# Patient Record
Sex: FEMALE | Race: WHITE | NOT HISPANIC OR LATINO | Employment: FULL TIME | RURAL
[De-identification: names, ages, dates, MRNs, and addresses within clinical notes are randomized per-mention and may not be internally consistent; named-entity substitution may affect disease eponyms.]

---

## 2018-07-05 ENCOUNTER — OFFICE VISIT (OUTPATIENT)
Dept: RETAIL CLINIC | Facility: CLINIC | Age: 48
End: 2018-07-05

## 2018-07-05 VITALS
SYSTOLIC BLOOD PRESSURE: 122 MMHG | OXYGEN SATURATION: 99 % | HEART RATE: 85 BPM | DIASTOLIC BLOOD PRESSURE: 80 MMHG | WEIGHT: 233 LBS | RESPIRATION RATE: 18 BRPM | HEIGHT: 63 IN | TEMPERATURE: 97.6 F | BODY MASS INDEX: 41.29 KG/M2

## 2018-07-05 DIAGNOSIS — R31.9 URINARY TRACT INFECTION WITH HEMATURIA, SITE UNSPECIFIED: Primary | ICD-10-CM

## 2018-07-05 DIAGNOSIS — N39.0 URINARY TRACT INFECTION WITH HEMATURIA, SITE UNSPECIFIED: Primary | ICD-10-CM

## 2018-07-05 DIAGNOSIS — K21.9 GASTROESOPHAGEAL REFLUX DISEASE, ESOPHAGITIS PRESENCE NOT SPECIFIED: ICD-10-CM

## 2018-07-05 LAB
BILIRUB BLD-MCNC: NEGATIVE MG/DL
CLARITY, POC: ABNORMAL
COLOR UR: YELLOW
GLUCOSE UR STRIP-MCNC: NEGATIVE MG/DL
KETONES UR QL: NEGATIVE
LEUKOCYTE EST, POC: ABNORMAL
NITRITE UR-MCNC: NEGATIVE MG/ML
PH UR: 6 [PH] (ref 5–8)
PROT UR STRIP-MCNC: ABNORMAL MG/DL
RBC # UR STRIP: ABNORMAL /UL
SP GR UR: 1.02 (ref 1–1.03)
UROBILINOGEN UR QL: NORMAL

## 2018-07-05 PROCEDURE — 81003 URINALYSIS AUTO W/O SCOPE: CPT | Performed by: NURSE PRACTITIONER

## 2018-07-05 PROCEDURE — 99203 OFFICE O/P NEW LOW 30 MIN: CPT | Performed by: NURSE PRACTITIONER

## 2018-07-05 RX ORDER — RANITIDINE 150 MG/1
150 CAPSULE ORAL 2 TIMES DAILY
Qty: 60 CAPSULE | Refills: 0 | Status: SHIPPED | OUTPATIENT
Start: 2018-07-05 | End: 2018-08-04

## 2018-07-05 RX ORDER — PHENAZOPYRIDINE HYDROCHLORIDE 200 MG/1
200 TABLET, FILM COATED ORAL 3 TIMES DAILY PRN
Qty: 9 TABLET | Refills: 0 | Status: SHIPPED | OUTPATIENT
Start: 2018-07-05 | End: 2018-07-07

## 2018-07-05 RX ORDER — SULFAMETHOXAZOLE AND TRIMETHOPRIM 800; 160 MG/1; MG/1
1 TABLET ORAL 2 TIMES DAILY
Qty: 10 TABLET | Refills: 0 | Status: SHIPPED | OUTPATIENT
Start: 2018-07-05 | End: 2018-07-10

## 2018-07-05 NOTE — PROGRESS NOTES
Subjective   Radha Kimbrough is a 48 y.o. female.   Chief Complaint   Patient presents with   • Urinary Tract Infection   • Heartburn      Urinary Tract Infection    This is a new problem. Episode onset: 3-4 days. The problem occurs every urination. The problem has been gradually worsening. The quality of the pain is described as burning. The pain is at a severity of 3/10. The pain is mild. There has been no fever. She is sexually active. History of pyelonephritis: has had uti in remote past. Associated symptoms include frequency, hesitancy and urgency. Pertinent negatives include no chills, discharge, flank pain, hematuria or vomiting. She has tried nothing for the symptoms. There is no history of kidney stones.   Heartburn   She complains of abdominal pain (suprapubic tenderness, bloating), belching and heartburn. She reports no sore throat. This is a new problem. The current episode started 1 to 4 weeks ago. The problem occurs frequently. The problem has been waxing and waning. The heartburn duration is less than a minute. The heartburn is located in the substernum. The heartburn is of mild intensity. The heartburn does not wake her from sleep. The heartburn does not limit her activity. The heartburn doesn't change with position. The symptoms are aggravated by stress and certain foods. Pertinent negatives include no anemia or fatigue. Risk factors include obesity. She has tried nothing for the symptoms.     Radha presents to [facility] with cc of dysuria, urgency and frequency for 3-4 days, she denies fever or chilling and says she has taken nothing for her symptoms.  Radha kristen says she has had belching, bloating, felt gaseous for over about a month and says she has taken nothing for those symptoms.  Reviewed PMFSH, has annual Flu Vaccine.  See ROS.       The following portions of the patient's history were reviewed and updated as appropriate: allergies, current medications, past family history, past medical  "history, past social history, past surgical history and problem list.    Review of Systems   Constitutional: Negative for chills, fatigue and fever.   HENT: Negative for sore throat.    Gastrointestinal: Positive for abdominal pain (suprapubic tenderness, bloating) and heartburn. Negative for abdominal distention and vomiting.   Endocrine: Negative for cold intolerance.   Genitourinary: Positive for dysuria, frequency, hesitancy and urgency. Negative for flank pain, hematuria and vaginal discharge.   Skin: Negative for pallor.   Allergic/Immunologic: Negative.  Negative for environmental allergies, food allergies and immunocompromised state.   Neurological: Negative for headaches.   Hematological: Negative for adenopathy.     /80 (BP Location: Right arm, Patient Position: Sitting, Cuff Size: Adult)   Pulse 85   Temp 97.6 °F (36.4 °C) (Temporal Artery )   Resp 18   Ht 160 cm (63\")   Wt 106 kg (233 lb)   LMP 06/05/2018 (Approximate)   SpO2 99%   BMI 41.27 kg/m²     Objective     Current Outpatient Prescriptions:   •  phenazopyridine (PYRIDIUM) 200 MG tablet, Take 1 tablet by mouth 3 (Three) Times a Day As Needed for bladder spasms for up to 2 days., Disp: 9 tablet, Rfl: 0  •  ranitidine (ZANTAC) 150 MG capsule, Take 1 capsule by mouth 2 (Two) Times a Day for 30 days., Disp: 60 capsule, Rfl: 0  •  sulfamethoxazole-trimethoprim (BACTRIM DS) 800-160 MG per tablet, Take 1 tablet by mouth 2 (Two) Times a Day for 5 days., Disp: 10 tablet, Rfl: 0  Allergies   Allergen Reactions   • Penicillins Shortness Of Breath     Had to go to the ER and have Benadryl injection       Physical Exam   Constitutional: She is oriented to person, place, and time. She appears well-developed and well-nourished. No distress.   HENT:   Head: Normocephalic and atraumatic.   Right Ear: Tympanic membrane and external ear normal.   Left Ear: Tympanic membrane and external ear normal.   Nose: Nose normal.   Mouth/Throat: Uvula is midline, " oropharynx is clear and moist and mucous membranes are normal.   Eyes: Conjunctivae and EOM are normal. Pupils are equal, round, and reactive to light.   Neck: Normal range of motion. Neck supple.   Cardiovascular: Normal rate, regular rhythm and normal heart sounds.    Pulmonary/Chest: Effort normal and breath sounds normal.   Abdominal: Soft. Bowel sounds are normal. She exhibits no distension and no mass. There is tenderness (suprapubic , mild epigastric tenderness) in the suprapubic area. There is no rebound and no guarding.   Musculoskeletal: Normal range of motion.   Neurological: She is alert and oriented to person, place, and time.   Skin: Skin is warm and dry. No rash noted.   Psychiatric: She has a normal mood and affect. Her behavior is normal. Judgment and thought content normal.   Nursing note and vitals reviewed.      Assessment/Plan   Radha was seen today for urinary tract infection and heartburn.    Diagnoses and all orders for this visit:    Urinary tract infection with hematuria, site unspecified  -     sulfamethoxazole-trimethoprim (BACTRIM DS) 800-160 MG per tablet; Take 1 tablet by mouth 2 (Two) Times a Day for 5 days.  -     phenazopyridine (PYRIDIUM) 200 MG tablet; Take 1 tablet by mouth 3 (Three) Times a Day As Needed for bladder spasms for up to 2 days.  -     POC Urinalysis Dipstick, Automated    Gastroesophageal reflux disease, esophagitis presence not specified  -     ranitidine (ZANTAC) 150 MG capsule; Take 1 capsule by mouth 2 (Two) Times a Day for 30 days.      Results for orders placed or performed in visit on 07/05/18   POC Urinalysis Dipstick, Automated   Result Value Ref Range    Color Yellow Yellow, Straw, Dark Yellow, Melisa    Clarity, UA Cloudy (A) Clear    Specific Gravity  1.020 1.005 - 1.030    pH, Urine 6.0 5.0 - 8.0    Leukocytes Moderate (2+) (A) Negative    Nitrite, UA Negative Negative    Protein, POC 30 mg/dL (A) Negative mg/dL    Glucose, UA Negative Negative, 1000  mg/dL (3+) mg/dL    Ketones, UA Negative Negative    Urobilinogen, UA Normal Normal    Bilirubin Negative Negative    Blood, UA 2+ (A) Negative

## 2018-07-05 NOTE — PATIENT INSTRUCTIONS
Gastroesophageal Reflux Disease, Adult  Normally, food travels down the esophagus and stays in the stomach to be digested. However, when a person has gastroesophageal reflux disease (GERD), food and stomach acid move back up into the esophagus. When this happens, the esophagus becomes sore and inflamed. Over time, GERD can create small holes (ulcers) in the lining of the esophagus.  What are the causes?  This condition is caused by a problem with the muscle between the esophagus and the stomach (lower esophageal sphincter, or LES). Normally, the LES muscle closes after food passes through the esophagus to the stomach. When the LES is weakened or abnormal, it does not close properly, and that allows food and stomach acid to go back up into the esophagus. The LES can be weakened by certain dietary substances, medicines, and medical conditions, including:  · Tobacco use.  · Pregnancy.  · Having a hiatal hernia.  · Heavy alcohol use.  · Certain foods and beverages, such as coffee, chocolate, onions, and peppermint.    What increases the risk?  This condition is more likely to develop in:  · People who have an increased body weight.  · People who have connective tissue disorders.  · People who use NSAID medicines.    What are the signs or symptoms?  Symptoms of this condition include:  · Heartburn.  · Difficult or painful swallowing.  · The feeling of having a lump in the throat.  · A bitter taste in the mouth.  · Bad breath.  · Having a large amount of saliva.  · Having an upset or bloated stomach.  · Belching.  · Chest pain.  · Shortness of breath or wheezing.  · Ongoing (chronic) cough or a night-time cough.  · Wearing away of tooth enamel.  · Weight loss.    Different conditions can cause chest pain. Make sure to see your health care provider if you experience chest pain.  How is this diagnosed?  Your health care provider will take a medical history and perform a physical exam. To determine if you have mild or severe  GERD, your health care provider may also monitor how you respond to treatment. You may also have other tests, including:  · An endoscopy to examine your stomach and esophagus with a small camera.  · A test that measures the acidity level in your esophagus.  · A test that measures how much pressure is on your esophagus.  · A barium swallow or modified barium swallow to show the shape, size, and functioning of your esophagus.    How is this treated?  The goal of treatment is to help relieve your symptoms and to prevent complications. Treatment for this condition may vary depending on how severe your symptoms are. Your health care provider may recommend:  · Changes to your diet.  · Medicine.  · Surgery.    Follow these instructions at home:  Diet  · Follow a diet as recommended by your health care provider. This may involve avoiding foods and drinks such as:  ? Coffee and tea (with or without caffeine).  ? Drinks that contain alcohol.  ? Energy drinks and sports drinks.  ? Carbonated drinks or sodas.  ? Chocolate and cocoa.  ? Peppermint and mint flavorings.  ? Garlic and onions.  ? Horseradish.  ? Spicy and acidic foods, including peppers, chili powder, paz powder, vinegar, hot sauces, and barbecue sauce.  ? Citrus fruit juices and citrus fruits, such as oranges, luis, and limes.  ? Tomato-based foods, such as red sauce, chili, salsa, and pizza with red sauce.  ? Fried and fatty foods, such as donuts, french fries, potato chips, and high-fat dressings.  ? High-fat meats, such as hot dogs and fatty cuts of red and white meats, such as rib eye steak, sausage, ham, and gilmore.  ? High-fat dairy items, such as whole milk, butter, and cream cheese.  · Eat small, frequent meals instead of large meals.  · Avoid drinking large amounts of liquid with your meals.  · Avoid eating meals during the 2-3 hours before bedtime.  · Avoid lying down right after you eat.  · Do not exercise right after you eat.  General  instructions  · Pay attention to any changes in your symptoms.  · Take over-the-counter and prescription medicines only as told by your health care provider. Do not take aspirin, ibuprofen, or other NSAIDs unless your health care provider told you to do so.  · Do not use any tobacco products, including cigarettes, chewing tobacco, and e-cigarettes. If you need help quitting, ask your health care provider.  · Wear loose-fitting clothing. Do not wear anything tight around your waist that causes pressure on your abdomen.  · Raise (elevate) the head of your bed 6 inches (15cm).  · Try to reduce your stress, such as with yoga or meditation. If you need help reducing stress, ask your health care provider.  · If you are overweight, reduce your weight to an amount that is healthy for you. Ask your health care provider for guidance about a safe weight loss goal.  · Keep all follow-up visits as told by your health care provider. This is important.  Contact a health care provider if:  · You have new symptoms.  · You have unexplained weight loss.  · You have difficulty swallowing, or it hurts to swallow.  · You have wheezing or a persistent cough.  · Your symptoms do not improve with treatment.  · You have a hoarse voice.  Get help right away if:  · You have pain in your arms, neck, jaw, teeth, or back.  · You feel sweaty, dizzy, or light-headed.  · You have chest pain or shortness of breath.  · You vomit and your vomit looks like blood or coffee grounds.  · You faint.  · Your stool is bloody or black.  · You cannot swallow, drink, or eat.  This information is not intended to replace advice given to you by your health care provider. Make sure you discuss any questions you have with your health care provider.  Document Released: 09/27/2006 Document Revised: 05/17/2017 Document Reviewed: 04/13/2016  [employer] Interactive Patient Education © 2018 [employer]  Urinary Tract Infection, Adult  A urinary tract infection (UTI) is an  infection of any part of the urinary tract, which includes the kidneys, ureters, bladder, and urethra. These organs make, store, and get rid of urine in the body. UTI can be a bladder infection (cystitis) or kidney infection (pyelonephritis).  What are the causes?  This infection may be caused by fungi, viruses, or bacteria. Bacteria are the most common cause of UTIs. This condition can also be caused by repeated incomplete emptying of the bladder during urination.  What increases the risk?  This condition is more likely to develop if:  · You ignore your need to urinate or hold urine for long periods of time.  · You do not empty your bladder completely during urination.  · You wipe back to front after urinating or having a bowel movement, if you are female.  · You are uncircumcised, if you are male.  · You are constipated.  · You have a urinary catheter that stays in place (indwelling).  · You have a weak defense (immune) system.  · You have a medical condition that affects your bowels, kidneys, or bladder.  · You have diabetes.  · You take antibiotic medicines frequently or for long periods of time, and the antibiotics no longer work well against certain types of infections (antibiotic resistance).  · You take medicines that irritate your urinary tract.  · You are exposed to chemicals that irritate your urinary tract.  · You are female.    What are the signs or symptoms?  Symptoms of this condition include:  · Fever.  · Frequent urination or passing small amounts of urine frequently.  · Needing to urinate urgently.  · Pain or burning with urination.  · Urine that smells bad or unusual.  · Cloudy urine.  · Pain in the lower abdomen or back.  · Trouble urinating.  · Blood in the urine.  · Vomiting or being less hungry than normal.  · Diarrhea or abdominal pain.  · Vaginal discharge, if you are female.    How is this diagnosed?  This condition is diagnosed with a medical history and physical exam. You will also need  to provide a urine sample to test your urine. Other tests may be done, including:  · Blood tests.  · Sexually transmitted disease (STD) testing.    If you have had more than one UTI, a cystoscopy or imaging studies may be done to determine the cause of the infections.  How is this treated?  Treatment for this condition often includes a combination of two or more of the following:  · Antibiotic medicine.  · Other medicines to treat less common causes of UTI.  · Over-the-counter medicines to treat pain.  · Drinking enough water to stay hydrated.    Follow these instructions at home:  · Take over-the-counter and prescription medicines only as told by your health care provider.  · If you were prescribed an antibiotic, take it as told by your health care provider. Do not stop taking the antibiotic even if you start to feel better.  · Avoid alcohol, caffeine, tea, and carbonated beverages. They can irritate your bladder.  · Drink enough fluid to keep your urine clear or pale yellow.  · Keep all follow-up visits as told by your health care provider. This is important.  · Make sure to:  ? Empty your bladder often and completely. Do not hold urine for long periods of time.  ? Empty your bladder before and after sex.  ? Wipe from front to back after a bowel movement if you are female. Use each tissue one time when you wipe.  Contact a health care provider if:  · You have back pain.  · You have a fever.  · You feel nauseous or vomit.  · Your symptoms do not get better after 3 days.  · Your symptoms go away and then return.  Get help right away if:  · You have severe back pain or lower abdominal pain.  · You are vomiting and cannot keep down any medicines or water.  This information is not intended to replace advice given to you by your health care provider. Make sure you discuss any questions you have with your health care provider.  Document Released: 09/27/2006 Document Revised: 05/31/2017 Document Reviewed:  11/07/2016  [employer] Interactive Patient Education © 2018 [employer]

## 2018-08-30 ENCOUNTER — OFFICE VISIT (OUTPATIENT)
Dept: RETAIL CLINIC | Facility: CLINIC | Age: 48
End: 2018-08-30

## 2018-08-30 VITALS
SYSTOLIC BLOOD PRESSURE: 128 MMHG | WEIGHT: 233 LBS | HEART RATE: 78 BPM | BODY MASS INDEX: 41.27 KG/M2 | DIASTOLIC BLOOD PRESSURE: 80 MMHG | TEMPERATURE: 98.3 F | RESPIRATION RATE: 20 BRPM | OXYGEN SATURATION: 98 %

## 2018-08-30 DIAGNOSIS — H66.91 OTITIS OF RIGHT EAR: Primary | ICD-10-CM

## 2018-08-30 DIAGNOSIS — H61.21 HEARING LOSS DUE TO CERUMEN IMPACTION, RIGHT: ICD-10-CM

## 2018-08-30 PROCEDURE — 99213 OFFICE O/P EST LOW 20 MIN: CPT | Performed by: NURSE PRACTITIONER

## 2018-08-30 PROCEDURE — 69209 REMOVE IMPACTED EAR WAX UNI: CPT | Performed by: NURSE PRACTITIONER

## 2018-08-30 RX ORDER — FLUTICASONE PROPIONATE 50 MCG
2 SPRAY, SUSPENSION (ML) NASAL DAILY
Qty: 1 BOTTLE | Refills: 0 | Status: SHIPPED | OUTPATIENT
Start: 2018-08-30 | End: 2018-09-29

## 2018-08-30 RX ORDER — CEFDINIR 300 MG/1
300 CAPSULE ORAL 2 TIMES DAILY
Qty: 20 CAPSULE | Refills: 0 | Status: SHIPPED | OUTPATIENT
Start: 2018-08-30 | End: 2018-09-09

## 2018-08-30 NOTE — PROGRESS NOTES
Subjective   Radha Kimbrough is a 48 y.o. female.     Chief Complaint   Patient presents with   • Earache   • Hearing Loss        Earache    There is pain in the right ear. This is a new problem. Episode onset: 5 days. The problem occurs constantly. The problem has been gradually worsening. There has been no fever. The pain is at a severity of 7/10. The pain is severe. Associated symptoms include ear discharge, headaches and hearing loss. Associated symptoms comments: Right side facial pressure, sinus drainage, decreased hearing right side. She has tried acetaminophen (sudafed, benadryl) for the symptoms. The treatment provided no relief.        The following portions of the patient's history were reviewed and updated as appropriate: allergies, current medications, past family history, past medical history, past social history, past surgical history and problem list.    Review of Systems   Constitutional: Negative.  Negative for fever.   HENT: Positive for ear discharge, ear pain, hearing loss, sinus pain and sinus pressure.    Eyes: Negative.    Respiratory: Negative.    Gastrointestinal: Negative.    Genitourinary: Negative.    Skin: Negative.    Allergic/Immunologic: Negative.    Neurological: Positive for headaches.   Psychiatric/Behavioral: Negative.    All other systems reviewed and are negative.      Objective   Allergies   Allergen Reactions   • Penicillins Shortness Of Breath     Had to go to the ER and have Benadryl injection       Physical Exam   Constitutional: She is oriented to person, place, and time. She appears well-developed and well-nourished.   HENT:   Right Ear: Tympanic membrane is injected and erythematous.   Left Ear: Tympanic membrane normal.   Nose: Mucosal edema present. Right sinus exhibits maxillary sinus tenderness and frontal sinus tenderness.   Mouth/Throat: Oropharynx is clear and moist.   Right side cerumen impaction   Eyes: Pupils are equal, round, and reactive to light.   Neck:  Neck supple.   Cardiovascular: Normal rate and regular rhythm.    Pulmonary/Chest: Effort normal and breath sounds normal.   Lymphadenopathy:     She has no cervical adenopathy.   Neurological: She is alert and oriented to person, place, and time.   Skin: Skin is warm and dry.   Psychiatric: She has a normal mood and affect.   Vitals reviewed.      Ear Cerumen Removal Instrumentation  Date/Time: 8/30/2018 11:15 AM  Performed by: JAN AYALA  Authorized by: JAN AYALA   Consent: Verbal consent obtained.  Risks and benefits: risks, benefits and alternatives were discussed  Consent given by: patient  Patient understanding: patient states understanding of the procedure being performed  Patient consent: the patient's understanding of the procedure matches consent given  Patient identity confirmed: verbally with patient    Anesthesia:  Local Anesthetic: none  Location details: right ear  Comments: Large cerumen plug expelled, TM intact but erythemic and injected. Tolerated well  Procedure type: irrigation (With curette)      Sedation:  Patient sedated: no             Assessment/Plan   Radha was seen today for earache and hearing loss.    Diagnoses and all orders for this visit:    Otitis of right ear    Hearing loss due to cerumen impaction, right    Other orders  -     cefdinir (OMNICEF) 300 MG capsule; Take 1 capsule by mouth 2 (Two) Times a Day for 10 days.  -     fluticasone (FLONASE) 50 MCG/ACT nasal spray; 2 sprays into the nostril(s) as directed by provider Daily for 30 days. Administer 2 sprays in each nostril for each dose.                 This document has been electronically signed by ERNESTO Alberto August 30, 2018 11:12 AM

## 2018-08-30 NOTE — PATIENT INSTRUCTIONS
Ear Drops, Adult  Your doctor has found that you have a condition that requires you to use ear drops. Ear drops are a medicine that is placed in the ear. This sheet gives you information about how to use this medicine. Your doctor may also give you more specific instructions.  Supplies needed:  · Cotton ball.  · Medicine.  How to put ear drops into your ear  1. Wash your hands with soap and water.  2. Make sure your ears are clean and dry.  3. Warm the medicine by holding it in your hand for a few minutes.  4. Shake the medicine to mix the ear drops.  5. Use the tube to get the medicine. You will need to squeeze the round part of the tube to do this.  6. Put the drops in your ear as told. Hold the tube above your ear. Do not let the tube touch your ear. The medicine may go in easier if you pull the flap of your ear up and back while you put the drops in.  7. To make sure your ear soaks up the medicine, do one of these things:  ? Lie down for 10 minutes. The ear with the medicine should face up.  ? Put a cotton ball in your ear. Do not push it deeper into your ear. Take out the cotton ball when the drops have been soaked up.  8. If you need to put drops in your other ear, repeat the same steps. Your doctor will tell you if you should put drops in both ears.  Follow these instructions at home:  · Use the ear drops for as long as your doctor tells you to. Do not stop even if your symptoms get better.  · Keep the ear drops at room temperature.  · Keep all follow-up visits as told by your doctor. This is important.  Contact a doctor if:  · Your condition gets worse.  · Your pain gets worse.  · Unusual fluid (drainage) is coming from your ear, especially if the fluid stinks.  · You have trouble hearing.  Get help right away if:  · You feel like the room is spinning and you feel sick to your stomach. This condition is called vertigo.  · The outside of your ear becomes red or swollen.  · You have a very bad  headache.  Summary  · Ear drops are a medicine that is put in the ear.  · Put the drops in your ear as told by your doctor.  · Use the ear drops for as long as your doctor tells you to. Do not stop even if your symptoms get better.  This information is not intended to replace advice given to you by your health care provider. Make sure you discuss any questions you have with your health care provider.  Document Released: 06/07/2011 Document Revised: 12/22/2017 Document Reviewed: 12/22/2017  [employer] Patient Education © 2017 [employer]    Earwax Buildup, Adult  The ears produce a substance called earwax that helps keep bacteria out of the ear and protects the skin in the ear canal. Occasionally, earwax can build up in the ear and cause discomfort or hearing loss.  What increases the risk?  This condition is more likely to develop in people who:  · Are male.  · Are elderly.  · Naturally produce more earwax.  · Clean their ears often with cotton swabs.  · Use earplugs often.  · Use in-ear headphones often.  · Wear hearing aids.  · Have narrow ear canals.  · Have earwax that is overly thick or sticky.  · Have eczema.  · Are dehydrated.  · Have excess hair in the ear canal.    What are the signs or symptoms?  Symptoms of this condition include:  · Reduced or muffled hearing.  · A feeling of fullness in the ear or feeling that the ear is plugged.  · Fluid coming from the ear.  · Ear pain.  · Ear itch.  · Ringing in the ear.  · Coughing.  · An obvious piece of earwax that can be seen inside the ear canal.    How is this diagnosed?  This condition may be diagnosed based on:  · Your symptoms.  · Your medical history.  · An ear exam. During the exam, your health care provider will look into your ear with an instrument called an otoscope.    You may have tests, including a hearing test.  How is this treated?  This condition may be treated by:  · Using ear drops to soften the earwax.  · Having the earwax removed  by a health care provider. The health care provider may:  ? Flush the ear with water.  ? Use an instrument that has a loop on the end (curette).  ? Use a suction device.  · Surgery to remove the wax buildup. This may be done in severe cases.    Follow these instructions at home:  · Take over-the-counter and prescription medicines only as told by your health care provider.  · Do not put any objects, including cotton swabs, into your ear. You can clean the opening of your ear canal with a washcloth or facial tissue.  · Follow instructions from your health care provider about cleaning your ears. Do not over-clean your ears.  · Drink enough fluid to keep your urine clear or pale yellow. This will help to thin the earwax.  · Keep all follow-up visits as told by your health care provider. If earwax builds up in your ears often or if you use hearing aids, consider seeing your health care provider for routine, preventive ear cleanings. Ask your health care provider how often you should schedule your cleanings.  · If you have hearing aids, clean them according to instructions from the  and your health care provider.  Contact a health care provider if:  · You have ear pain.  · You develop a fever.  · You have blood, pus, or other fluid coming from your ear.  · You have hearing loss.  · You have ringing in your ears that does not go away.  · Your symptoms do not improve with treatment.  · You feel like the room is spinning (vertigo).  Summary  · Earwax can build up in the ear and cause discomfort or hearing loss.  · The most common symptoms of this condition include reduced or muffled hearing and a feeling of fullness in the ear or feeling that the ear is plugged.  · This condition may be diagnosed based on your symptoms, your medical history, and an ear exam.  · This condition may be treated by using ear drops to soften the earwax or by having the earwax removed by a health care provider.  · Do not put any  objects, including cotton swabs, into your ear. You can clean the opening of your ear canal with a washcloth or facial tissue.  This information is not intended to replace advice given to you by your health care provider. Make sure you discuss any questions you have with your health care provider.  Document Released: 01/25/2006 Document Revised: 02/28/2018 Document Reviewed: 02/28/2018  [employer] Patient Education © 2018 [employer]    Otitis Media, Adult  Otitis media is redness, soreness, and puffiness (swelling) in the space just behind your eardrum (middle ear). It may be caused by allergies or infection. It often happens along with a cold.  Follow these instructions at home:  · Take your medicine as told. Finish it even if you start to feel better.  · Only take over-the-counter or prescription medicines for pain, discomfort, or fever as told by your doctor.  · Follow up with your doctor as told.  Contact a doctor if:  · You have otitis media only in one ear, or bleeding from your nose, or both.  · You notice a lump on your neck.  · You are not getting better in 3-5 days.  · You feel worse instead of better.  Get help right away if:  · You have pain that is not helped with medicine.  · You have puffiness, redness, or pain around your ear.  · You get a stiff neck.  · You cannot move part of your face (paralysis).  · You notice that the bone behind your ear hurts when you touch it.  This information is not intended to replace advice given to you by your health care provider. Make sure you discuss any questions you have with your health care provider.  Document Released: 06/05/2009 Document Revised: 05/25/2017 Document Reviewed: 07/15/2014  [employer] Patient Education © 2017 [employer]

## 2019-03-20 ENCOUNTER — OFFICE VISIT (OUTPATIENT)
Dept: RETAIL CLINIC | Facility: CLINIC | Age: 49
End: 2019-03-20

## 2019-03-20 VITALS
WEIGHT: 230 LBS | SYSTOLIC BLOOD PRESSURE: 124 MMHG | HEART RATE: 86 BPM | BODY MASS INDEX: 40.74 KG/M2 | RESPIRATION RATE: 20 BRPM | OXYGEN SATURATION: 96 % | TEMPERATURE: 98.8 F | DIASTOLIC BLOOD PRESSURE: 82 MMHG

## 2019-03-20 DIAGNOSIS — H91.92 HEARING LOSS OF LEFT EAR, UNSPECIFIED HEARING LOSS TYPE: Primary | ICD-10-CM

## 2019-03-20 DIAGNOSIS — H61.22 IMPACTED CERUMEN OF LEFT EAR: ICD-10-CM

## 2019-03-20 PROCEDURE — 99213 OFFICE O/P EST LOW 20 MIN: CPT | Performed by: NURSE PRACTITIONER

## 2019-03-20 PROCEDURE — 69209 REMOVE IMPACTED EAR WAX UNI: CPT | Performed by: NURSE PRACTITIONER

## 2019-03-20 RX ORDER — CYANOCOBALAMIN 1000 UG/ML
INJECTION, SOLUTION INTRAMUSCULAR; SUBCUTANEOUS
COMMUNITY
Start: 2019-03-18 | End: 2022-11-04

## 2019-03-20 RX ORDER — IBUPROFEN AND FAMOTIDINE 800; 26.6 MG/1; MG/1
TABLET, COATED ORAL
COMMUNITY
Start: 2019-01-31 | End: 2022-11-04

## 2019-03-20 RX ORDER — ERGOCALCIFEROL 1.25 MG/1
CAPSULE ORAL
COMMUNITY
Start: 2019-02-27 | End: 2022-11-04

## 2019-03-20 NOTE — PATIENT INSTRUCTIONS
Hearing Loss  Hearing loss is a partial or total loss of the ability to hear. This can be temporary or permanent, and it can happen in one or both ears. Hearing loss may be referred to as deafness.  Medical care is necessary to treat hearing loss properly and to prevent the condition from getting worse. Your hearing may partially or completely come back, depending on what caused your hearing loss and how severe it is. In some cases, hearing loss is permanent.  What are the causes?  Common causes of hearing loss include:  · Too much wax in the ear canal.  · Infection of the ear canal or middle ear.  · Fluid in the middle ear.  · Injury to the ear or surrounding area.  · An object stuck in the ear.  · Prolonged exposure to loud sounds, such as music.    Less common causes of hearing loss include:  · Tumors in the ear.  · Viral or bacterial infections, such as meningitis.  · A hole in the eardrum (perforated eardrum).  · Problems with the hearing nerve that sends signals between the brain and the ear.  · Certain medicines.    What are the signs or symptoms?  Symptoms of this condition may include:  · Difficulty telling the difference between sounds.  · Difficulty following a conversation when there is background noise.  · Lack of response to sounds in your environment. This may be most noticeable when you do not respond to startling sounds.  · Needing to turn up the volume on the television, radio, etc.  · Ringing in the ears.  · Dizziness.  · Pain in the ears.    How is this diagnosed?  This condition is diagnosed based on a physical exam and a hearing test (audiometry). The audiometry test will be performed by a hearing specialist (audiologist). You may also be referred to an ear, nose, and throat (ENT) specialist (otolaryngologist).  How is this treated?  Treatment for recent onset of hearing loss may include:  · Ear wax removal.  · Being prescribed medicines to prevent infection (antibiotics).  · Being prescribed  medicines to reduce inflammation (corticosteroids).    Follow these instructions at home:  · If you were prescribed an antibiotic medicine, take it as told by your health care provider. Do not stop taking the antibiotic even if you start to feel better.  · Take over-the-counter and prescription medicines only as told by your health care provider.  · Avoid loud noises.  · Return to your normal activities as told by your health care provider. Ask your health care provider what activities are safe for you.  · Keep all follow-up visits as told by your health care provider. This is important.  Contact a health care provider if:  · You feel dizzy.  · You develop new symptoms.  · You vomit or feel nauseous.  · You have a fever.  Get help right away if:  · You develop sudden changes in your vision.  · You have severe ear pain.  · You have new or increased weakness.  · You have a severe headache.  This information is not intended to replace advice given to you by your health care provider. Make sure you discuss any questions you have with your health care provider.  Document Released: 12/18/2006 Document Revised: 05/25/2017 Document Reviewed: 05/04/2016  [employer] Interactive Patient Education © 2019 [employer]    Ear Irrigation  What is ear irrigation?  Ear irrigation is a procedure to wash dirt and wax out of your ear canal. This procedure is also called lavage. You may need ear irrigation if you are having trouble hearing because of a buildup of earwax. You may also have ear irrigation as part of the treatment for an ear infection. Getting wax and dirt out of your ear canal can help some medicines given as ear drops work better.  How is ear irrigation performed?  The procedure may vary among health care providers and hospitals. You may be given ear drops to put in your ear 15-20 minutes before irrigation. This helps loosen the wax. Then, a syringe containing water and a sterile salt solution (saline) can be gently  inserted into the ear canal. The saline is used to flush out wax and other debris.  Ear irrigation kits are also available to use at home. Ask your health care provider if this is an option for you. Use a home irrigation kit only as told by your health care provider. Read the package instructions carefully. Follow the directions for using the syringe. Use water that is room temperature.  Do not do ear irrigation at home if you:  · Have diabetes. Diabetes increases the risk of infection.  · Have a hole or tear in your eardrum.  · Have tubes in your ears.    What are the risks of ear irrigation?  Generally, this is a safe procedure. However, problems may occur, including:  · Infection.  · Pain.  · Hearing loss.  · Pushing water and debris into the eardrum. This can occur if there are holes in the eardrum.  · Ear irrigation failing to work.    How should I care for my ears after having them irrigated?  Cleaning  · Clean the outside of your ear with a soft washcloth daily.  · If told by your health care provider, use a few drops of baby oil, mineral oil, glycerin, hydrogen peroxide, or over-the-counter earwax softening drops.  · Do not use cotton swabs to clean your ears. These can push wax down into the ear canal.  · Do not put anything into your ears to try to remove wax. This includes ear candles.  General Instructions  · Take over-the-counter and prescription medicines only as told by your health care provider.  · If you were prescribed an antibiotic medicine, use it as told by your health care provider. Do not stop using the antibiotic even if your condition improves.  · Keep all follow-up visits as told by your health care provider. This is important.  · Visit your health care provider at least once a year to have your ears and hearing checked.  When should I seek medical care?  Seek medical care if:  · Your hearing is not improving or is getting worse.  · You have pain or redness in your ear.  · You have fluid,  blood, or pus coming out of your ear.    This information is not intended to replace advice given to you by your health care provider. Make sure you discuss any questions you have with your health care provider.  Document Released: 01/13/2017 Document Revised: 08/01/2018 Document Reviewed: 05/26/2016  [employer] Interactive Patient Education © 2019 [employer]

## 2019-03-20 NOTE — PROGRESS NOTES
Subjective   Radha Kimbrough is a 48 y.o. female.     Chief Complaint   Patient presents with   • Hearing Loss        Hearing Problem   This is a recurrent problem. The current episode started in the past 7 days. The problem occurs constantly. The problem has been gradually worsening. Pertinent negatives include no chills, congestion, coughing, fever or sore throat. Nothing aggravates the symptoms. Treatments tried: ear drops. The treatment provided no relief.        The following portions of the patient's history were reviewed and updated as appropriate: allergies, current medications, past family history, past medical history, past social history, past surgical history and problem list.    Review of Systems   Constitutional: Negative.  Negative for chills and fever.   HENT: Positive for hearing loss. Negative for congestion, ear discharge, ear pain and sore throat.    Eyes: Negative.    Respiratory: Negative.  Negative for cough.    Gastrointestinal: Negative.    Genitourinary: Negative.    Skin: Negative.    Allergic/Immunologic: Negative.    Psychiatric/Behavioral: Negative.    All other systems reviewed and are negative.      Objective   Allergies   Allergen Reactions   • Penicillins Shortness Of Breath     Had to go to the ER and have Benadryl injection       Physical Exam   Constitutional: She is oriented to person, place, and time. She appears well-developed and well-nourished.   HENT:   Right Ear: Tympanic membrane normal.   Left Ear: Tympanic membrane normal. A foreign body (cerumen impaction) is present.   Nose: Nose normal.   Mouth/Throat: Oropharynx is clear and moist.   Cerumen buildup bilaterally, occluding left TM   Eyes: Pupils are equal, round, and reactive to light.   Neck: Neck supple.   Cardiovascular: Normal rate and regular rhythm.   Pulmonary/Chest: Effort normal and breath sounds normal.   Lymphadenopathy:     She has no cervical adenopathy.   Neurological: She is alert and oriented to  person, place, and time.   Skin: Skin is warm and dry. No rash noted.   Psychiatric: She has a normal mood and affect.   Vitals reviewed.      Ear Cerumen Removal Instrumentation  Date/Time: 3/20/2019 8:15 AM  Performed by: Vikram Lerma APRN  Authorized by: Vikram Lerma APRN   Consent: Verbal consent obtained.  Risks and benefits: risks, benefits and alternatives were discussed  Consent given by: patient  Patient understanding: patient states understanding of the procedure being performed  Patient consent: the patient's understanding of the procedure matches consent given  Patient identity confirmed: verbally with patient    Anesthesia:  Local Anesthetic: none  Ceruminolytics applied: Ceruminolytics applied prior to the procedure. (at home)  Location details: left ear  Procedure type: irrigation   Sedation:  Patient sedated: no               Assessment/Plan   Radha was seen today for hearing loss.    Diagnoses and all orders for this visit:    Hearing loss of left ear, unspecified hearing loss type    Impacted cerumen of left ear                 This document has been electronically signed by ERNESTO Alberto March 20, 2019 12:14 PM

## 2020-10-06 ENCOUNTER — TRANSCRIBE ORDERS (OUTPATIENT)
Dept: ADMINISTRATIVE | Facility: HOSPITAL | Age: 50
End: 2020-10-06

## 2020-10-06 DIAGNOSIS — Z20.828 EXPOSURE TO SARS-ASSOCIATED CORONAVIRUS: Primary | ICD-10-CM

## 2022-02-15 ENCOUNTER — PATIENT ROUNDING (BHMG ONLY) (OUTPATIENT)
Dept: ORTHOPEDIC SURGERY | Facility: CLINIC | Age: 52
End: 2022-02-15

## 2022-02-15 NOTE — PROGRESS NOTES
February 15, 2022    Hello, may I speak with Radha Kimbrough?    My name is RICHARD     I am  with [facility]  [facility]  [address]  [place] [address] [phone].    Before we get started may I verify your date of birth? 1970    I am calling to officially welcome you to our practice and ask about your recent visit. Is this a good time to talk? YES    Tell me about your visit with us. What things went well? THE VISIT WAS FINE.  EVERYONE WAS NICE.       We're always looking for ways to make our patients' experiences even better. Do you have recommendations on ways we may improve?  NO. NONE    Overall were you satisfied with your first visit to our practice? YES       I appreciate you taking the time to speak with me today. Is there anything else I can do for you? NO      Thank you, and have a great day.

## 2022-10-07 ENCOUNTER — TRANSCRIBE ORDERS (OUTPATIENT)
Dept: ADMINISTRATIVE | Facility: HOSPITAL | Age: 52
End: 2022-10-07

## 2022-10-07 DIAGNOSIS — R07.9 CHEST PAIN, UNSPECIFIED TYPE: Primary | ICD-10-CM

## 2022-10-19 ENCOUNTER — TRANSCRIBE ORDERS (OUTPATIENT)
Dept: ADMINISTRATIVE | Facility: HOSPITAL | Age: 52
End: 2022-10-19

## 2022-10-19 DIAGNOSIS — R74.8 ELEVATED LIVER ENZYMES: Primary | ICD-10-CM

## 2022-10-21 ENCOUNTER — HOSPITAL ENCOUNTER (OUTPATIENT)
Dept: ULTRASOUND IMAGING | Facility: HOSPITAL | Age: 52
Discharge: HOME | End: 2022-10-21
Admitting: NURSE PRACTITIONER

## 2022-10-21 DIAGNOSIS — R74.8 ELEVATED LIVER ENZYMES: ICD-10-CM

## 2022-10-21 PROCEDURE — 76700 US EXAM ABDOM COMPLETE: CPT | Performed by: RADIOLOGY

## 2022-10-21 PROCEDURE — 76700 US EXAM ABDOM COMPLETE: CPT

## 2022-11-01 ENCOUNTER — HOSPITAL ENCOUNTER (OUTPATIENT)
Dept: NUCLEAR MEDICINE | Facility: HOSPITAL | Age: 52
Discharge: HOME | End: 2022-11-01

## 2022-11-01 ENCOUNTER — HOSPITAL ENCOUNTER (OUTPATIENT)
Dept: CARDIOLOGY | Facility: HOSPITAL | Age: 52
Discharge: HOME | End: 2022-11-01

## 2022-11-01 DIAGNOSIS — R07.9 CHEST PAIN, UNSPECIFIED TYPE: ICD-10-CM

## 2022-11-01 LAB
BH CV NUCLEAR PRIOR STUDY: 3
BH CV REST NUCLEAR ISOTOPE DOSE: 9.8 MCI
BH CV STRESS BP STAGE 1: NORMAL
BH CV STRESS COMMENTS STAGE 1: NORMAL
BH CV STRESS DOSE REGADENOSON STAGE 1: 0.4
BH CV STRESS DURATION MIN STAGE 1: 0
BH CV STRESS DURATION SEC STAGE 1: 10
BH CV STRESS HR STAGE 1: 106
BH CV STRESS NUCLEAR ISOTOPE DOSE: 30.2 MCI
BH CV STRESS PROTOCOL 1: NORMAL
BH CV STRESS RECOVERY BP: NORMAL MMHG
BH CV STRESS RECOVERY HR: 90 BPM
BH CV STRESS STAGE 1: 1
LV EF NUC BP: 83 %
MAXIMAL PREDICTED HEART RATE: 167 BPM
PERCENT MAX PREDICTED HR: 63.47 %
STRESS BASELINE BP: NORMAL MMHG
STRESS BASELINE HR: 96 BPM
STRESS PERCENT HR: 75 %
STRESS POST PEAK BP: NORMAL MMHG
STRESS POST PEAK HR: 106 BPM
STRESS TARGET HR: 142 BPM

## 2022-11-01 PROCEDURE — 25010000002 REGADENOSON 0.4 MG/5ML SOLUTION: Performed by: INTERNAL MEDICINE

## 2022-11-01 PROCEDURE — 0 TECHNETIUM SESTAMIBI: Performed by: INTERNAL MEDICINE

## 2022-11-01 PROCEDURE — 78452 HT MUSCLE IMAGE SPECT MULT: CPT | Performed by: SPECIALIST

## 2022-11-01 PROCEDURE — 93017 CV STRESS TEST TRACING ONLY: CPT

## 2022-11-01 PROCEDURE — A9500 TC99M SESTAMIBI: HCPCS | Performed by: INTERNAL MEDICINE

## 2022-11-01 PROCEDURE — 78452 HT MUSCLE IMAGE SPECT MULT: CPT

## 2022-11-01 PROCEDURE — 93018 CV STRESS TEST I&R ONLY: CPT | Performed by: SPECIALIST

## 2022-11-01 RX ADMIN — REGADENOSON 0.4 MG: 0.08 INJECTION, SOLUTION INTRAVENOUS at 08:39

## 2022-11-01 RX ADMIN — TECHNETIUM TC 99M SESTAMIBI 1 DOSE: 1 INJECTION INTRAVENOUS at 08:39

## 2022-11-01 RX ADMIN — TECHNETIUM TC 99M SESTAMIBI 1 DOSE: 1 INJECTION INTRAVENOUS at 07:25

## 2022-11-04 ENCOUNTER — OFFICE VISIT (OUTPATIENT)
Dept: CARDIOLOGY | Facility: CLINIC | Age: 52
End: 2022-11-04

## 2022-11-04 VITALS
WEIGHT: 253.4 LBS | HEIGHT: 63 IN | DIASTOLIC BLOOD PRESSURE: 90 MMHG | SYSTOLIC BLOOD PRESSURE: 131 MMHG | BODY MASS INDEX: 44.9 KG/M2 | HEART RATE: 88 BPM

## 2022-11-04 DIAGNOSIS — R07.2 PRECORDIAL PAIN: Primary | ICD-10-CM

## 2022-11-04 PROCEDURE — 99203 OFFICE O/P NEW LOW 30 MIN: CPT | Performed by: INTERNAL MEDICINE

## 2022-11-04 PROCEDURE — 93000 ELECTROCARDIOGRAM COMPLETE: CPT | Performed by: INTERNAL MEDICINE

## 2022-11-04 RX ORDER — ASPIRIN 81 MG/1
81 TABLET ORAL DAILY
Qty: 30 TABLET | Refills: 3 | Status: SHIPPED | OUTPATIENT
Start: 2022-11-04

## 2022-11-04 NOTE — PROGRESS NOTES
Kalia Cruz MD  Radha Kimbrough  1970  11/04/2022    There is no problem list on file for this patient.      Dear Kalia Cruz MD:    Subjective     Radha Kimbrough is a 52 y.o. female with the problems as listed above, presents    Chief complaint: Recurrent chest pains.    History of Present Illness: Ms. Kimbrough is a pleasant 52-year-old  female with no history of known heart disease or coronary artery disease, nondiabetic, nonhypertensive and non-smoker, presents with complaints of having recurrent chest pains and pressure over the last 3 weeks.  She describes these episodes as being felt as pressure and tightness across her chest that seem to occur at random with no relation to exertion and resolve spontaneously with resting and relaxing.  They usually last for about 15 to 20minutes and is of moderate intensity.  There is associated shortness of breath but no sweating.  She denies any radiation of pain into the arm neck or back.  She does get nauseated with the pain and feels tired and fatigued after that.  She denies any significant dyspnea, PND, orthopnea but has some intermittent left leg edema with a previous history of DVT in that leg 10 years ago.  She reportedly had COVID infection in August 2022 that did not require any hospitalization.  She had a Lexiscan sestamibi study on 11/1/2022 that was reported to be normal.      Allergies   Allergen Reactions   • Penicillins Shortness Of Breath     Had to go to the ER and have Benadryl injection   :      Current Outpatient Medications:   •  aspirin 81 MG EC tablet, Take 1 tablet by mouth Daily., Disp: 30 tablet, Rfl: 3    Past Medical History:   Diagnosis Date   • Acid reflux    • Migraines    • Urinary tract infection     has been a long time since she has had a UTI     Past Surgical History:   Procedure Laterality Date   • CHOLECYSTECTOMY     • ENDOMETRIAL ABLATION     • TUBAL ABDOMINAL LIGATION       Family History   Problem  "Relation Age of Onset   • Cancer Mother    • Kidney cancer Mother    • Heart attack Father    • Heart disease Father    • Diabetes Father    • Heart disease Maternal Grandmother    • Stroke Maternal Grandmother    • Cancer Maternal Grandfather    • Lung cancer Maternal Grandfather      Social History     Tobacco Use   • Smoking status: Never   • Smokeless tobacco: Never   Vaping Use   • Vaping Use: Never used   Substance Use Topics   • Alcohol use: No   • Drug use: No       Review of Systems   Constitutional: Positive for malaise/fatigue. Negative for chills, fever, weight gain and weight loss.   HENT: Negative for congestion and nosebleeds.    Eyes: Negative.    Cardiovascular: Positive for dyspnea on exertion and leg swelling. Negative for chest pain, irregular heartbeat and orthopnea.   Respiratory: Positive for shortness of breath. Negative for cough and hemoptysis.    Endocrine: Negative.    Hematologic/Lymphatic: Negative.    Skin: Negative.    Musculoskeletal: Negative.    Gastrointestinal: Positive for abdominal pain. Negative for change in bowel habit, hematemesis, melena and vomiting.   Genitourinary: Negative.  Negative for dysuria, frequency and hematuria.   Neurological: Positive for headaches. Negative for focal weakness, light-headedness and weakness.   Psychiatric/Behavioral: Positive for depression.       Objective   Blood pressure 131/90, pulse 88, height 160 cm (63\"), weight 115 kg (253 lb 6.4 oz).  Body mass index is 44.89 kg/m².    Constitutional:       Appearance: Well-developed.   Eyes:      Conjunctiva/sclera: Conjunctivae normal.   HENT:      Head: Normocephalic.   Neck:      Thyroid: No thyromegaly.      Vascular: No JVD.      Trachea: No tracheal deviation.   Pulmonary:      Effort: No respiratory distress.      Breath sounds: Normal breath sounds. No wheezing. No rales.   Cardiovascular:      PMI at left midclavicular line. Normal rate. Regular rhythm. Normal S1. Normal S2.      Murmurs: " There is no murmur.      No gallop. No click. No rub.   Pulses:     Carotid: 2+ bilaterally.     Radial: 2+ bilaterally.     Femoral: 2+ bilaterally.     Dorsalis pedis: 2+ bilaterally.     Posterior tibial: 2+ bilaterally.  Edema:     Pretibial: 1+ edema of the left pretibial area.     Ankle: bilateral 1+ edema of the ankle.     Feet: 1+ edema of the left foot.  Abdominal:      General: Bowel sounds are normal.      Palpations: Abdomen is soft. There is no abdominal mass.      Tenderness: There is no abdominal tenderness.   Musculoskeletal:      Cervical back: Normal range of motion and neck supple. Skin:     General: Skin is warm and dry.   Neurological:      Mental Status: Alert and oriented to person, place, and time.      Cranial Nerves: No cranial nerve deficit.         ECG 12 Lead    Date/Time: 11/4/2022 11:49 AM  Performed by: Tripp Gonzalez MD  Authorized by: Tripp Gonzalez MD   Previous ECG: no previous ECG available  Rhythm: sinus rhythm  BPM: 72  Conduction: conduction normal  ST Segments: ST segments normal    Clinical impression: normal ECG            Assessment & Plan :   Diagnosis Plan    Precordial pain with recent normal myocardial perfusion study.             Recommendations:  Orders Placed This Encounter   Procedures   • ECG 12 Lead      1. Since the nuclear stress test was normal recently, we will leave her alone.  2. If recurrent significant chest pains, will consider further cardiac evaluation with either CT coronary angiogram or cardiac catheterization.  3. Continue to emphasize on risk factor modification as needed for now.  4. Since she has some risk factors such as positive family history and her age as well as postmenopausal status, will initiate enteric-coated aspirin 81 mg daily..    Return in about 2 months (around 1/4/2023) for or sooner if needed.    As always, I appreciate very much the opportunity to participate in the cardiovascular care of your patients.      With Best  Regards,    Tripp Gonzalez MD, [facility]    Dragon disclaimer:  Much of this encounter note is an electronic transcription/translation of spoken language to printed text. The electronic translation of spoken language may permit erroneous, or at times, nonsensical words or phrases to be inadvertently transcribed; Although I have reviewed the note for such errors, some may still exist.

## 2022-11-11 ENCOUNTER — APPOINTMENT (OUTPATIENT)
Dept: NUCLEAR MEDICINE | Facility: HOSPITAL | Age: 52
End: 2022-11-11

## 2022-11-11 ENCOUNTER — APPOINTMENT (OUTPATIENT)
Dept: CARDIOLOGY | Facility: HOSPITAL | Age: 52
End: 2022-11-11

## 2024-05-15 ENCOUNTER — TRANSCRIBE ORDERS (OUTPATIENT)
Dept: ADMINISTRATIVE | Facility: HOSPITAL | Age: 54
End: 2024-05-15
Payer: COMMERCIAL

## 2024-05-15 DIAGNOSIS — R10.9 BILATERAL FLANK PAIN: Primary | ICD-10-CM

## 2024-12-17 ENCOUNTER — TRANSCRIBE ORDERS (OUTPATIENT)
Dept: ADMINISTRATIVE | Facility: HOSPITAL | Age: 54
End: 2024-12-17
Payer: COMMERCIAL

## 2024-12-17 DIAGNOSIS — K75.81 NASH (NONALCOHOLIC STEATOHEPATITIS): Primary | ICD-10-CM

## 2025-03-23 ENCOUNTER — TRANSCRIBE ORDERS (OUTPATIENT)
Dept: ADMINISTRATIVE | Facility: HOSPITAL | Age: 55
End: 2025-03-23
Payer: COMMERCIAL

## 2025-03-23 ENCOUNTER — HOSPITAL ENCOUNTER (OUTPATIENT)
Dept: ULTRASOUND IMAGING | Facility: HOSPITAL | Age: 55
Discharge: HOME | End: 2025-03-23
Admitting: PHYSICIAN ASSISTANT
Payer: COMMERCIAL

## 2025-03-23 DIAGNOSIS — S80.12XA CONTUSION OF LEFT LOWER LEG, INITIAL ENCOUNTER: ICD-10-CM

## 2025-03-23 DIAGNOSIS — M79.89 SWELLING OF CALF: Primary | ICD-10-CM

## 2025-03-23 PROCEDURE — 93971 EXTREMITY STUDY: CPT

## 2025-03-23 PROCEDURE — 93971 EXTREMITY STUDY: CPT | Performed by: RADIOLOGY
